# Patient Record
Sex: FEMALE
[De-identification: names, ages, dates, MRNs, and addresses within clinical notes are randomized per-mention and may not be internally consistent; named-entity substitution may affect disease eponyms.]

---

## 2019-06-04 ENCOUNTER — HOSPITAL ENCOUNTER (INPATIENT)
Dept: HOSPITAL 92 - L&D | Age: 29
LOS: 3 days | Discharge: HOME | End: 2019-06-07
Attending: OBSTETRICS & GYNECOLOGY | Admitting: OBSTETRICS & GYNECOLOGY
Payer: COMMERCIAL

## 2019-06-04 VITALS — BODY MASS INDEX: 25.9 KG/M2

## 2019-06-04 DIAGNOSIS — O34.211: Primary | ICD-10-CM

## 2019-06-04 DIAGNOSIS — Z3A.39: ICD-10-CM

## 2019-06-04 LAB
HBSAG INDEX: 0.25 S/CO (ref 0–0.99)
HGB BLD-MCNC: 11.3 G/DL (ref 12–16)
MCH RBC QN AUTO: 29.8 PG (ref 27–31)
MCV RBC AUTO: 88.6 FL (ref 78–98)
PLATELET # BLD AUTO: 298 THOU/UL (ref 130–400)
RBC # BLD AUTO: 3.8 MILL/UL (ref 4.2–5.4)
SYPHILIS ANTIBODY INDEX: 0.02 S/CO
WBC # BLD AUTO: 9.6 THOU/UL (ref 4.8–10.8)

## 2019-06-04 PROCEDURE — 85461 HEMOGLOBIN FETAL: CPT

## 2019-06-04 PROCEDURE — 87340 HEPATITIS B SURFACE AG IA: CPT

## 2019-06-04 PROCEDURE — 86870 RBC ANTIBODY IDENTIFICATION: CPT

## 2019-06-04 PROCEDURE — 36415 COLL VENOUS BLD VENIPUNCTURE: CPT

## 2019-06-04 PROCEDURE — 96372 THER/PROPH/DIAG INJ SC/IM: CPT

## 2019-06-04 PROCEDURE — 86900 BLOOD TYPING SEROLOGIC ABO: CPT

## 2019-06-04 PROCEDURE — 86901 BLOOD TYPING SEROLOGIC RH(D): CPT

## 2019-06-04 PROCEDURE — 51702 INSERT TEMP BLADDER CATH: CPT

## 2019-06-04 PROCEDURE — 90384 RH IG FULL-DOSE IM: CPT

## 2019-06-04 PROCEDURE — 86850 RBC ANTIBODY SCREEN: CPT

## 2019-06-04 PROCEDURE — 86780 TREPONEMA PALLIDUM: CPT

## 2019-06-04 PROCEDURE — 85027 COMPLETE CBC AUTOMATED: CPT

## 2019-06-04 NOTE — PDOC.OPDEL
OB Operative/Delivery Note


Delivery Dr/Surgeon: Dr. Krishnan


Assist: Dr. Milan


Pre-Delivery Diagnosis: scheduled  section


Procedure/Post Delivery Dx: repeat low transverse CS


Weeks gestation: 39


Anesthesia: spinal





- Findings


  ** A


Sex: male





- Additional Findings/Plan


Placenta delivered: manual removal


 findings: low transverse hysterotomy without extension


Compilations/Other Findings: 


Preoperative Diagnosis: 


1)Term intrauterine pregnancy


2)Previous 


3)Previous vertical skin incision





Postoperative Diagnosis:  


1)Term intrauterine pregnancy, delivered


2)Previous 


3)Previous vertical skin incision





Anesthesia: spinal





Indications: The patient is a 28 year old  female at 39.0 weeks 

gestation who presents for a repeat scheduled . 





Procedure in Detail: After risks, benefits, and alternatives were explained to 

the patient, she gave informed consent.  Pre-operative antibiotics included 

Cefazolin 2 gram IV.  The patient was taken to the operating room and spinal 

anesthesia was initiated.  She was placed in the supine position with a left 

tilt and prepped and draped in usual sterile fashion.  A vertical incision 

incision was made with a scalpel and carried down to the level of the fascia 

which was sharply nicked.  The fascial cut was extended bilaterally with Barrera 

scissors.  The inferior and superior edges of the cut fascial edges were 

elevated with Kocher clamps and the underlying rectus muscles were sharply and 

bluntly dissected free.  The recti were divided digitally and retracted 

manually.  The peritoneum was entered bluntly and retracted manually.  Bladder 

blade was placed.  Bladder flap was created with Metzenbaum scissors.  A low 

transverse score was made with the scalpel and the uterus was entered in the 

midline bluntly.  Clear fluid was seen.  The hysterotomy was extended manually 

in a cephalocaudal fashion.  The infant was noted to be vertex and was easily 

delivered by fundal pressure.  Mouth and nares were bulb suctioned.  Cord 

clamped and cut after one minute delayed cord clamping and grossly normal male 

infant was handed to waiting nurse.  Cord blood was obtained.  Placenta was 

manually extracted, found to be intact with 3 vessel cord and discarded.  The 

uterus was externalized and the endometrium was curetted with a dry lap.  The 

bladder blade was replaced and the uterus was closed with a running locking 0-

Vicryl followed by a few figure of eight sutures with 0-Vicryl.  Following this 

hemostasis was noted. Seprafilm was placed on the uterus. The abdomen was 

irrigated with saline and suctioned free of clots.  The uterus was internalized 

and the hysterotomy was again noted to be hemostatic.  The fascia was closed 

with a running non-locking 0-PDS suture.  The subcutaneous tissue was examined 

and there were no bleeders. The skin was approximated with staples and a 

pressure dressing was placed.  All counts were correct.  The patient tolerated 

the procedure well and was taken to the recovery room in stable condition.  





Time of delivery: 1226 on 19


Quantitative blood loss: pending, see nurses notes


Complications: None


Specimens: Cord blood sent to lab for blood type


Findings: Grossly normal male infant went to nursery for routine care.  Grossly 

normal placenta with 3 vessel cord discarded.


Drains:  Gómez to gravity draining clear urine








Post delivery plan: routine recovery

## 2019-06-05 LAB
HGB BLD-MCNC: 8.4 G/DL (ref 12–16)
MCH RBC QN AUTO: 30.6 PG (ref 27–31)
MCV RBC AUTO: 88.7 FL (ref 78–98)
PLATELET # BLD AUTO: 233 THOU/UL (ref 130–400)
RBC # BLD AUTO: 2.74 MILL/UL (ref 4.2–5.4)
WBC # BLD AUTO: 9 THOU/UL (ref 4.8–10.8)

## 2019-06-05 RX ADMIN — DOCUSATE CALCIUM SCH MG: 240 CAPSULE, LIQUID FILLED ORAL at 08:40

## 2019-06-05 RX ADMIN — SIMETHICONE PRN MG: 80 TABLET, CHEWABLE ORAL at 09:26

## 2019-06-05 RX ADMIN — SIMETHICONE PRN MG: 80 TABLET, CHEWABLE ORAL at 03:53

## 2019-06-05 RX ADMIN — DOCUSATE CALCIUM SCH: 240 CAPSULE, LIQUID FILLED ORAL at 05:26

## 2019-06-05 RX ADMIN — HYDROCODONE BITARTRATE AND ACETAMINOPHEN PRN TAB: 5; 325 TABLET ORAL at 21:38

## 2019-06-05 RX ADMIN — HYDROCODONE BITARTRATE AND ACETAMINOPHEN PRN TAB: 5; 325 TABLET ORAL at 08:41

## 2019-06-05 RX ADMIN — HYDROCODONE BITARTRATE AND ACETAMINOPHEN PRN TAB: 5; 325 TABLET ORAL at 15:08

## 2019-06-05 RX ADMIN — SIMETHICONE PRN MG: 80 TABLET, CHEWABLE ORAL at 21:39

## 2019-06-05 RX ADMIN — DOCUSATE CALCIUM SCH MG: 240 CAPSULE, LIQUID FILLED ORAL at 21:24

## 2019-06-05 RX ADMIN — SIMETHICONE PRN MG: 80 TABLET, CHEWABLE ORAL at 15:08

## 2019-06-06 RX ADMIN — HYDROCODONE BITARTRATE AND ACETAMINOPHEN PRN TAB: 5; 325 TABLET ORAL at 07:57

## 2019-06-06 RX ADMIN — DOCUSATE CALCIUM SCH MG: 240 CAPSULE, LIQUID FILLED ORAL at 20:46

## 2019-06-06 RX ADMIN — DOCUSATE CALCIUM SCH MG: 240 CAPSULE, LIQUID FILLED ORAL at 07:58

## 2019-06-06 RX ADMIN — HYDROCODONE BITARTRATE AND ACETAMINOPHEN PRN TAB: 5; 325 TABLET ORAL at 20:47

## 2019-06-07 VITALS — DIASTOLIC BLOOD PRESSURE: 53 MMHG | SYSTOLIC BLOOD PRESSURE: 96 MMHG | TEMPERATURE: 98.5 F

## 2019-06-07 RX ADMIN — HYDROCODONE BITARTRATE AND ACETAMINOPHEN PRN TAB: 5; 325 TABLET ORAL at 09:34

## 2019-06-07 RX ADMIN — DOCUSATE CALCIUM SCH MG: 240 CAPSULE, LIQUID FILLED ORAL at 09:34

## 2019-06-07 RX ADMIN — HYDROCODONE BITARTRATE AND ACETAMINOPHEN PRN TAB: 5; 325 TABLET ORAL at 05:18
